# Patient Record
Sex: MALE | Race: WHITE | NOT HISPANIC OR LATINO | Employment: FULL TIME | URBAN - NONMETROPOLITAN AREA
[De-identification: names, ages, dates, MRNs, and addresses within clinical notes are randomized per-mention and may not be internally consistent; named-entity substitution may affect disease eponyms.]

---

## 2018-04-02 ENCOUNTER — TELEPHONE (OUTPATIENT)
Dept: UROLOGY | Facility: CLINIC | Age: 28
End: 2018-04-02

## 2018-05-15 ENCOUNTER — OFFICE VISIT (OUTPATIENT)
Dept: UROLOGY | Facility: CLINIC | Age: 28
End: 2018-05-15

## 2018-05-15 VITALS — WEIGHT: 311 LBS | BODY MASS INDEX: 43.54 KG/M2 | HEIGHT: 71 IN

## 2018-05-15 DIAGNOSIS — R10.2 PERINEAL PAIN IN MALE: Primary | ICD-10-CM

## 2018-05-15 DIAGNOSIS — R33.9 INCOMPLETE EMPTYING OF BLADDER: ICD-10-CM

## 2018-05-15 LAB
BILIRUB BLD-MCNC: NEGATIVE MG/DL
CLARITY, POC: CLEAR
COLOR UR: YELLOW
GLUCOSE UR STRIP-MCNC: NEGATIVE MG/DL
KETONES UR QL: NEGATIVE
LEUKOCYTE EST, POC: NEGATIVE
NITRITE UR-MCNC: NEGATIVE MG/ML
PH UR: 5 [PH] (ref 5–8)
PROT UR STRIP-MCNC: NEGATIVE MG/DL
RBC # UR STRIP: NEGATIVE /UL
SP GR UR: 1 (ref 1–1.03)
UROBILINOGEN UR QL: NORMAL

## 2018-05-15 PROCEDURE — 99204 OFFICE O/P NEW MOD 45 MIN: CPT | Performed by: UROLOGY

## 2018-05-15 PROCEDURE — 51798 US URINE CAPACITY MEASURE: CPT | Performed by: UROLOGY

## 2018-05-15 NOTE — PROGRESS NOTES
Chief Complaint:          Perineal pain    HPI:   27 y.o. male.  Pt complains of perineal pain that started 4 months ago after a weekend with his girl friend with unusual frequency of intercourse the pain began.  Severity of pain was 9 on scale of 10.  Prolonged sitting over 1 to 2 hours makes it worse.  Pt's pain has been so severe that he could not stand at work.  He works as a  at a restaurant.  The pain improves with movement .  It usually last an hour or so.  Pt voided 150 cc and his post void bladder scan showed a residual today of 81 cc.  HPI      Past Medical History:        Past Medical History:   Diagnosis Date   • Hypertension    • Prostatitis          Current Meds:     No current outpatient prescriptions on file.     No current facility-administered medications for this visit.         Allergies:      Allergies   Allergen Reactions   • Codeine Swelling        Past Surgical History:     Past Surgical History:   Procedure Laterality Date   • FOOT SURGERY     • TONSILLECTOMY           Social History:     Social History     Social History   • Marital status: Single     Spouse name: N/A   • Number of children: N/A   • Years of education: N/A     Occupational History   • Not on file.     Social History Main Topics   • Smoking status: Never Smoker   • Smokeless tobacco: Never Used   • Alcohol use No   • Drug use: No   • Sexual activity: Not on file     Other Topics Concern   • Not on file     Social History Narrative   • No narrative on file       Family History:     Family History   Problem Relation Age of Onset   • Cancer Mother    • Heart disease Mother    • Kidney cancer Mother        Review of Systems:     Review of Systems   Constitutional: Negative for fatigue.   HENT: Negative for sinus pain and sinus pressure.    Eyes: Negative for pain.   Respiratory: Negative for chest tightness.    Cardiovascular: Negative for chest pain.   Gastrointestinal: Negative for abdominal pain and constipation.    Endocrine: Negative for heat intolerance.   Genitourinary: Positive for genital sores.   Musculoskeletal: Negative for back pain.   Skin: Negative for rash.   Allergic/Immunologic: Negative for food allergies.   Neurological: Negative for headaches.   Hematological: Does not bruise/bleed easily.   Psychiatric/Behavioral: The patient is nervous/anxious.        I have reviewed the follow portions of the patient's history and confirmed they are accurate today:  allergies, current medications, past family history, past medical history, past social history, past surgical history, problem list and ROS  Physical Exam:     Physical Exam   Constitutional: He is oriented to person, place, and time.   HENT:   Head: Normocephalic and atraumatic.   Right Ear: External ear normal.   Left Ear: External ear normal.   Nose: Nose normal.   Mouth/Throat: Oropharynx is clear and moist.   Eyes: Conjunctivae and EOM are normal. Pupils are equal, round, and reactive to light.   Neck: Normal range of motion. Neck supple. No thyromegaly present.   Cardiovascular: Normal rate, regular rhythm, normal heart sounds and intact distal pulses.    No murmur heard.  Pulmonary/Chest: Effort normal and breath sounds normal. No respiratory distress. He has no wheezes. He has no rales. He exhibits no tenderness.   Abdominal: Soft. Bowel sounds are normal. He exhibits no distension and no mass. There is no tenderness. No hernia.   Genitourinary: Rectum normal, prostate normal and penis normal.   Genitourinary Comments: No nodules or tenderness or bogginess   Musculoskeletal: Normal range of motion. He exhibits no edema or tenderness.   Lymphadenopathy:     He has no cervical adenopathy.   Neurological: He is alert and oriented to person, place, and time. No cranial nerve deficit. He exhibits normal muscle tone. Coordination normal.   Skin: Skin is warm. No rash noted.   Psychiatric: He has a normal mood and affect. His behavior is normal. Judgment and  "thought content normal.   Nursing note and vitals reviewed.      Ht 180.3 cm (71\")   Wt (!) 141 kg (311 lb)   BMI 43.38 kg/m²    Procedure:         Assessment:     Encounter Diagnoses   Name Primary?   • Perineal pain in male Yes   • Incomplete emptying of bladder      Orders Placed This Encounter   Procedures   • Bladder Scan   • POC Urinalysis Dipstick       Plan:   I discussed with the pt that his perineal pain is more likely musculoskeletal in origin.  I am concerned about his post void bladder scan.  Pt has had enuresis until age 14.  Consider cystoscopy if post void bladder ultrasound continues to be high.  Will have pt take regular walks and if he is going to sit I would have him use a soft cushion.    Patient's Body mass index is 43.38 kg/m². BMI is above normal parameters. Recommendations include: educational material.      Counseling was given to patient and family for the following topics diagnostic results including: obesity, risk factor reductions including: sleep apnea and impressions as follows: perineal pain. The interim medical history and current results were reviewed.  A treatment plan with follow-up was made for Perineal pain in male [R10.2].This document has been electronically signed by Chevy Rojas MD May 18, 2018 7:12 AM  "

## 2020-09-29 ENCOUNTER — OFFICE VISIT (OUTPATIENT)
Dept: UROLOGY | Facility: CLINIC | Age: 30
End: 2020-09-29

## 2020-09-29 VITALS — TEMPERATURE: 97.9 F | BODY MASS INDEX: 44.1 KG/M2 | HEIGHT: 71 IN | WEIGHT: 315 LBS

## 2020-09-29 DIAGNOSIS — N42.1: ICD-10-CM

## 2020-09-29 DIAGNOSIS — N48.9 PENILE LESION: Primary | ICD-10-CM

## 2020-09-29 PROCEDURE — 99213 OFFICE O/P EST LOW 20 MIN: CPT | Performed by: UROLOGY

## 2020-09-29 NOTE — PROGRESS NOTES
Chief Complaint:          Penile lesion    HPI:   29 y.o. male.  Pt has some deep feeling of being squeezed in the testicles and this discomfort is relieved with ejaculation.  He denies difficulty voiding.  He also complains of a skin lesion on the left side of his penis.  HPI      Past Medical History:        Past Medical History:   Diagnosis Date   • Hypertension    • Prostatitis          Current Meds:     No current outpatient medications on file.     No current facility-administered medications for this visit.         Allergies:      Allergies   Allergen Reactions   • Codeine Swelling        Past Surgical History:     Past Surgical History:   Procedure Laterality Date   • FOOT SURGERY     • TONSILLECTOMY           Social History:     Social History     Socioeconomic History   • Marital status: Single     Spouse name: Not on file   • Number of children: Not on file   • Years of education: Not on file   • Highest education level: Not on file   Tobacco Use   • Smoking status: Never Smoker   • Smokeless tobacco: Never Used   Substance and Sexual Activity   • Alcohol use: No   • Drug use: No   • Sexual activity: Defer       Family History:     Family History   Problem Relation Age of Onset   • Cancer Mother    • Heart disease Mother    • Kidney cancer Mother        Review of Systems:     Review of Systems   Constitutional: Negative for chills, fatigue and fever.   HENT: Negative for congestion and sinus pressure.    Respiratory: Negative for chest tightness and shortness of breath.    Cardiovascular: Negative for chest pain.   Gastrointestinal: Negative for abdominal pain, constipation, diarrhea, nausea and vomiting.   Genitourinary: Positive for testicular pain. Negative for dysuria, flank pain, frequency, hematuria and urgency.   Musculoskeletal: Negative for back pain and neck pain.   Skin: Positive for rash.   Neurological: Negative for dizziness and headaches.   Hematological: Does not bruise/bleed easily.  "  Psychiatric/Behavioral: The patient is not nervous/anxious.        I  Physical Exam:     Physical Exam  Vitals signs and nursing note reviewed.   Constitutional:       Comments: Morbidly obese   HENT:      Head: Normocephalic and atraumatic.      Right Ear: External ear normal.      Left Ear: External ear normal.      Nose: Nose normal.   Eyes:      Conjunctiva/sclera: Conjunctivae normal.      Pupils: Pupils are equal, round, and reactive to light.   Neck:      Musculoskeletal: Normal range of motion and neck supple.      Thyroid: No thyromegaly.   Cardiovascular:      Rate and Rhythm: Normal rate and regular rhythm.      Heart sounds: Normal heart sounds. No murmur.   Pulmonary:      Effort: Pulmonary effort is normal. No respiratory distress.      Breath sounds: Normal breath sounds. No wheezing or rales.   Chest:      Chest wall: No tenderness.   Abdominal:      General: Bowel sounds are normal. There is no distension.      Palpations: Abdomen is soft. There is no mass.      Tenderness: There is no abdominal tenderness.      Hernia: No hernia is present.   Genitourinary:     Penis: Normal.       Prostate: Normal.      Rectum: Normal.      Comments: 7 mm mid shaft on penis on the left.  Musculoskeletal: Normal range of motion.         General: No tenderness.   Lymphadenopathy:      Cervical: No cervical adenopathy.   Skin:     General: Skin is warm.      Findings: No rash.   Neurological:      Mental Status: He is alert and oriented to person, place, and time.      Cranial Nerves: No cranial nerve deficit.      Motor: No abnormal muscle tone.      Coordination: Coordination normal.   Psychiatric:         Behavior: Behavior normal.         Thought Content: Thought content normal.         Judgment: Judgment normal.         Temp 97.9 °F (36.6 °C)   Ht 180.3 cm (71\")   Wt (!) 152 kg (334 lb)   BMI 46.58 kg/m²    Procedure:         Assessment:     Encounter Diagnoses   Name Primary?   • Penile lesion Yes   • " Congestion of prostate        No orders of the defined types were placed in this encounter.      Patient reports that he is not currently experiencing any symptoms of urinary incontinence.      Plan:   If he wants the lesion fulgurated we can schedule that.  The prostatic congestion is more difficult.    Patient's Body mass index is 46.58 kg/m². BMI is above normal parameters. Recommendations include: referral to primary care.      Smoking Cessation Counseling:  Non smoker    Counseling was given to patient for the following topics instructions for management as follows: penile lesion and prostate issues.. The interim medical history and current results were reviewed.  A treatment plan with follow-up was made for Penile lesion [N48.9].     This document has been electronically signed by Chevy Rojas MD September 29, 2020 15:09 EDT

## 2021-03-23 ENCOUNTER — BULK ORDERING (OUTPATIENT)
Dept: CASE MANAGEMENT | Facility: OTHER | Age: 31
End: 2021-03-23

## 2021-03-23 DIAGNOSIS — Z23 IMMUNIZATION DUE: ICD-10-CM

## 2021-04-12 ENCOUNTER — APPOINTMENT (OUTPATIENT)
Dept: VACCINE CLINIC | Facility: HOSPITAL | Age: 31
End: 2021-04-12